# Patient Record
Sex: FEMALE | Race: BLACK OR AFRICAN AMERICAN | Employment: UNEMPLOYED | URBAN - METROPOLITAN AREA
[De-identification: names, ages, dates, MRNs, and addresses within clinical notes are randomized per-mention and may not be internally consistent; named-entity substitution may affect disease eponyms.]

---

## 2017-09-08 PROBLEM — R74.01 TRANSAMINITIS: Status: ACTIVE | Noted: 2017-09-08

## 2017-09-08 PROBLEM — K80.20 CHOLELITHIASIS: Status: ACTIVE | Noted: 2017-09-08

## 2017-09-08 PROBLEM — K85.90 ACUTE PANCREATITIS: Status: ACTIVE | Noted: 2017-09-08

## 2017-09-21 ENCOUNTER — OFFICE VISIT (OUTPATIENT)
Dept: BARIATRICS/WEIGHT MGMT | Age: 27
End: 2017-09-21

## 2017-09-21 VITALS — BODY MASS INDEX: 30.46 KG/M2 | WEIGHT: 178.4 LBS | HEIGHT: 64 IN

## 2017-09-21 DIAGNOSIS — Z90.49 S/P LAPAROSCOPIC CHOLECYSTECTOMY: Primary | ICD-10-CM

## 2017-09-21 PROCEDURE — 99024 POSTOP FOLLOW-UP VISIT: CPT | Performed by: SURGERY

## 2017-09-21 RX ORDER — OXYCODONE HYDROCHLORIDE AND ACETAMINOPHEN 5; 325 MG/1; MG/1
1 TABLET ORAL EVERY 6 HOURS PRN
COMMUNITY
Start: 2017-09-13

## 2020-05-12 ENCOUNTER — HOSPITAL ENCOUNTER (EMERGENCY)
Age: 30
Discharge: HOME | End: 2020-05-12
Attending: EMERGENCY MEDICINE
Payer: COMMERCIAL

## 2020-05-12 VITALS
OXYGEN SATURATION: 99 % | SYSTOLIC BLOOD PRESSURE: 130 MMHG | DIASTOLIC BLOOD PRESSURE: 101 MMHG | WEIGHT: 190 LBS | HEART RATE: 80 BPM | BODY MASS INDEX: 32.61 KG/M2 | TEMPERATURE: 98 F | RESPIRATION RATE: 16 BRPM

## 2020-05-12 PROCEDURE — 99284 EMERGENCY DEPT VISIT MOD MDM: CPT

## 2020-05-12 PROCEDURE — 94761 N-INVAS EAR/PLS OXIMETRY MLT: CPT

## 2020-05-12 PROCEDURE — 6370000000 HC RX 637 (ALT 250 FOR IP): Performed by: EMERGENCY MEDICINE

## 2020-05-12 PROCEDURE — 6360000002 HC RX W HCPCS: Performed by: EMERGENCY MEDICINE

## 2020-05-12 PROCEDURE — 94640 AIRWAY INHALATION TREATMENT: CPT

## 2020-05-12 RX ORDER — ALBUTEROL SULFATE 2.5 MG/3ML
2.5 SOLUTION RESPIRATORY (INHALATION) ONCE
Status: COMPLETED | OUTPATIENT
Start: 2020-05-12 | End: 2020-05-12

## 2020-05-12 RX ORDER — PREDNISONE 10 MG/1
TABLET ORAL
Qty: 20 TABLET | Refills: 0 | Status: SHIPPED | OUTPATIENT
Start: 2020-05-12 | End: 2020-05-22

## 2020-05-12 RX ORDER — PREDNISONE 20 MG/1
40 TABLET ORAL ONCE
Status: COMPLETED | OUTPATIENT
Start: 2020-05-12 | End: 2020-05-12

## 2020-05-12 RX ORDER — IPRATROPIUM BROMIDE AND ALBUTEROL SULFATE 2.5; .5 MG/3ML; MG/3ML
1 SOLUTION RESPIRATORY (INHALATION)
Status: DISCONTINUED | OUTPATIENT
Start: 2020-05-12 | End: 2020-05-12

## 2020-05-12 RX ORDER — IPRATROPIUM BROMIDE AND ALBUTEROL SULFATE 2.5; .5 MG/3ML; MG/3ML
1 SOLUTION RESPIRATORY (INHALATION) ONCE
Status: COMPLETED | OUTPATIENT
Start: 2020-05-12 | End: 2020-05-12

## 2020-05-12 RX ORDER — ALBUTEROL SULFATE 2.5 MG/3ML
2.5 SOLUTION RESPIRATORY (INHALATION) EVERY 6 HOURS PRN
Status: DISCONTINUED | OUTPATIENT
Start: 2020-05-12 | End: 2020-05-12

## 2020-05-12 RX ORDER — ALBUTEROL SULFATE 90 UG/1
2 AEROSOL, METERED RESPIRATORY (INHALATION) EVERY 4 HOURS PRN
Qty: 1 INHALER | Refills: 0 | Status: SHIPPED | OUTPATIENT
Start: 2020-05-12

## 2020-05-12 RX ADMIN — ALBUTEROL SULFATE 2.5 MG: 2.5 SOLUTION RESPIRATORY (INHALATION) at 02:20

## 2020-05-12 RX ADMIN — IPRATROPIUM BROMIDE AND ALBUTEROL SULFATE 1 AMPULE: .5; 3 SOLUTION RESPIRATORY (INHALATION) at 02:21

## 2020-05-12 RX ADMIN — ALBUTEROL SULFATE 2.5 MG: 2.5 SOLUTION RESPIRATORY (INHALATION) at 02:21

## 2020-05-12 RX ADMIN — ALBUTEROL SULFATE 2.5 MG: 2.5 SOLUTION RESPIRATORY (INHALATION) at 05:32

## 2020-05-12 RX ADMIN — PREDNISONE 40 MG: 20 TABLET ORAL at 02:40

## 2020-05-12 ASSESSMENT — PAIN SCALES - GENERAL
PAINLEVEL_OUTOF10: 6
PAINLEVEL_OUTOF10: 5

## 2020-05-12 ASSESSMENT — PAIN SCALES - WONG BAKER: WONGBAKER_NUMERICALRESPONSE: 6

## 2020-05-12 ASSESSMENT — PAIN DESCRIPTION - LOCATION: LOCATION: CHEST

## 2020-05-12 NOTE — ED NOTES
Bed: A10-10  Expected date:   Expected time:   Means of arrival:   Comments:  asthma     Andrea Armas, GILDA  05/12/20 0145

## 2020-05-12 NOTE — ED PROVIDER NOTES
family history is not on file. She reports that she has been smoking cigars. She has never used smokeless tobacco. She reports current alcohol use of about 5.0 standard drinks of alcohol per week. She reports that she does not use drugs. Medications     Previous Medications    ALBUTEROL    by Does not apply route. ALBUTEROL (PROVENTIL HFA) 108 (90 BASE) MCG/ACT INHALER    Inhale 2 puffs into the lungs every 4 hours as needed for Wheezing or Shortness of Breath (Space out to every 6 hours as symptoms improve). Space out to every 6 hours as symptoms improve. BENZOCAINE-MENTHOL (CEPACOL SORE THROAT) 15-3.6 MG LOZENGE    Take 1 lozenge by mouth every 2 hours as needed for Sore Throat    CETIRIZINE HCL (ZYRTEC ALLERGY) 10 MG CAPS    Take 1 capsule by mouth daily. MAGNESIUM HYDROXIDE (MILK OF MAGNESIA) 400 MG/5ML SUSPENSION    Take 30 mLs by mouth daily as needed for Constipation    OXYCODONE-ACETAMINOPHEN (PERCOCET) 5-325 MG PER TABLET    Take 1 tablet by mouth every 6 hours as needed  . PRENATAL MV-MIN-FE FUM-FA-DHA (PRENATAL 1 PO)    Take by mouth    SENNOSIDES-DOCUSATE SODIUM (SENOKOT-S) 8.6-50 MG TABLET    Take 2 tablets by mouth daily as needed for Constipation       Allergies     She has No Known Allergies. Physical Exam     INITIAL VITALS: BP: (!) 131/113, Temp: 98 °F (36.7 °C), Pulse: 123, Resp: 22, SpO2: 100 %   Physical Exam  Vitals signs and nursing note reviewed. Constitutional:       General: She is in acute distress (mild). Appearance: Normal appearance. HENT:      Head: Normocephalic and atraumatic. Nose: Nose normal.      Mouth/Throat:      Mouth: Mucous membranes are moist.   Eyes:      Extraocular Movements: Extraocular movements intact. Conjunctiva/sclera: Conjunctivae normal.      Pupils: Pupils are equal, round, and reactive to light. Neck:      Musculoskeletal: Normal range of motion and neck supple. Cardiovascular:      Rate and Rhythm: Regular rhythm. Tachycardia present. Pulses: Normal pulses. Heart sounds: No murmur. No gallop. Pulmonary:      Effort: Respiratory distress (mod) present. Breath sounds: No stridor. Wheezing (Biphasic diffuse with severely prolonged expirations) present. No rhonchi. Comments: Speaking in 1-2 word sentences, sitting straight upright in bed  Abdominal:      Palpations: Abdomen is soft. Tenderness: There is no abdominal tenderness. There is no guarding or rebound. Musculoskeletal: Normal range of motion. General: No swelling or deformity. Skin:     General: Skin is warm and dry. Capillary Refill: Capillary refill takes less than 2 seconds. Neurological:      General: No focal deficit present. Mental Status: She is alert and oriented to person, place, and time. Cranial Nerves: No cranial nerve deficit. Sensory: No sensory deficit. Motor: No weakness. Psychiatric:         Mood and Affect: Mood normal.         DiagnosticResults     RADIOLOGY:  No orders to display       LABS:   No results found for this visit on 05/12/20. RECENT VITALS:  BP: (!) 131/113, Temp: 98 °F (36.7 °C), Pulse: 123,Resp: 22, SpO2: 100 %     Procedures   None    ED Course     Nursing Notes, Past Medical Hx, Past Surgical Hx, Social Hx, Allergies, and Family Hx were reviewed.     The patient was given the followingmedications:  Orders Placed This Encounter   Medications    DISCONTD: ipratropium-albuterol (DUONEB) nebulizer solution 1 ampule    DISCONTD: albuterol (PROVENTIL) nebulizer solution 2.5 mg    predniSONE (DELTASONE) tablet 40 mg    ipratropium-albuterol (DUONEB) nebulizer solution 1 ampule    albuterol (PROVENTIL) nebulizer solution 2.5 mg    albuterol (PROVENTIL) nebulizer solution 2.5 mg    albuterol (PROVENTIL) (5 MG/ML) 0.5% nebulizer solution     Sig: Take 1 mL by nebulization 4 times daily as needed for Wheezing     Dispense:  120 each     Refill:  3    predniSONE

## 2020-05-13 ENCOUNTER — CARE COORDINATION (OUTPATIENT)
Dept: CASE MANAGEMENT | Age: 30
End: 2020-05-13

## 2020-05-13 NOTE — CARE COORDINATION
number: [phone]  Based on Loop alert triggers, patient will be contacted by nurse care manager for worsening symptoms. Pt will be further monitored by COVID Loop Team based on symptoms and risk factors.